# Patient Record
Sex: MALE | Race: WHITE
[De-identification: names, ages, dates, MRNs, and addresses within clinical notes are randomized per-mention and may not be internally consistent; named-entity substitution may affect disease eponyms.]

---

## 2020-01-16 ENCOUNTER — HOSPITAL ENCOUNTER (OUTPATIENT)
Dept: HOSPITAL 47 - RADCTMAIN | Age: 70
Discharge: HOME | End: 2020-01-16
Attending: UROLOGY
Payer: MEDICARE

## 2020-01-16 DIAGNOSIS — I25.10: ICD-10-CM

## 2020-01-16 DIAGNOSIS — K44.9: ICD-10-CM

## 2020-01-16 DIAGNOSIS — R59.9: ICD-10-CM

## 2020-01-16 DIAGNOSIS — K57.30: Primary | ICD-10-CM

## 2020-01-16 DIAGNOSIS — K40.90: ICD-10-CM

## 2020-01-16 DIAGNOSIS — M79.89: ICD-10-CM

## 2020-01-16 PROCEDURE — 74176 CT ABD & PELVIS W/O CONTRAST: CPT

## 2020-01-16 NOTE — CT
EXAMINATION TYPE: CT abdomen pelvis wo con

 

DATE OF EXAM: 1/16/2020

 

COMPARISON: None

 

HISTORY: 69-year-old male Microhematuria

 

CT DLP: 981 mGycm. Automated exposure control for dose reduction was used.

 

TECHNIQUE: Contiguous axial scanning of the abdomen and pelvis without IV contrast. Coronal and sagit
max reconstructions performed. 

 

FINDINGS: 

Three-vessel coronary artery calcifications are present. Heart normal size. No pericardial effusion. 
Lung bases clear without pleural effusion.

 

Small hiatal hernia.

 

Couple hypodensities within the liver measuring up to 1.2 cm likely benign cysts. Possible vague unde
rlying 4.0 cm lesion left hepatic dome, refer to axial image 19. Lack of IV contrast diminishes sensi
tivity.

 

Gallbladder, adrenal glands, and pancreas show no gross abnormal body by noncontrast CT.

 

Small 1.1 cm cortical hypodensity medial upper pole right kidney too small for accurate CT characteri
zation, probable cyst.

 

Suspect parapelvic cysts within the left kidney measuring 2.1 cm and 1.3 cm.

 

No nephrolithiasis or hydronephrosis seen.

 

The spleen is borderline enlarged at 13.6 cm measured on coronal series.

 

There is a 1.7 cm soft tissue nodule at the left upper quadrant, axial image 22. This seems to be sep
arate from the splenic artery. A small gastric fundal diverticulum is suspected. This can be reassess
ed at follow-up.

 

Some prominent fluid-filled small bowel loops in the left side of the abdomen likely transient.

 

A couple borderline sized lymph nodes within the right lower quadrant and mid lower mesentery measuri
ng up to 7 mm are nonspecific.

 

No secondary findings of acute appendicitis.

 

Mild to moderate scattered stool. Severe diverticulosis mid to distal sigmoid colon. No pericolonic i
nflammatory change.

 

Scattered mild atherosclerotic calcifications infrarenal abdominal aorta and iliac arteries.

 

Partial distention of the bladder. Some prominent bilateral inguinal lymph nodes are present. Largest
 measures 1.9 cm on the right and is mildly enlarged, refer to coronal image 32. A small direct left 
inguinal hernia containing a short segment small bowel loop and fat. Prostate gland not seen.

 

Bones: Mild degenerative changes at the hips. Degenerative changes right SI joint. Dextroconvex scoli
osis with associated moderate degenerative disc disease upper lumbar spine.

 

 

 

 

IMPRESSION: 

1.  No nephrolithiasis or hydronephrosis. Rounded areas at the left renal sinus measuring 2.1 and 1.3
 cm is suspected to represent parapelvic cysts.

2.  Indeterminate 1.1 cm cortical lesion medial upper pole right kidney, probable cortical cyst.

3. A 1.7 cm soft tissue nodule left upper quadrant appears separate from the splenic artery. Either a
 splenule or small gastric fundal diverticulum is suspected. Short interval follow-up recommended suc
h as in 3-6 months to reassess.

4. Possible underlying 4.0 cm left hepatic dome lesion subtly apparent on this noncontrast exam. Cons
ider liver ultrasound to further evaluate. If the area remains indeterminate, a contrast enhanced CT 
may be indicated.

5. Extensive mid to distal sigmoid diverticulosis. No evidence for acute diverticulitis.

6. Scattered nonenlarged and borderline sized mesenteric lymph nodes measuring up to 7 mm are nonspec
ific, likely reactive/post inflammatory.

7. Prominent bilateral inguinal lymph nodes, largest is mildly enlarged on the right measuring 1.9 cm
 short axis. Clinical follow-up recommended. If any enlarging lymphadenopathy is noted, the area can 
be re-imaged or sampled.

8. Prostate gland not seen. Small direct left inguinal hernia. Small hiatal hernia. CAD.

## 2021-04-02 ENCOUNTER — HOSPITAL ENCOUNTER (OUTPATIENT)
Dept: HOSPITAL 47 - LABWHC1 | Age: 71
Discharge: HOME | End: 2021-04-02
Attending: UROLOGY
Payer: MEDICARE

## 2021-04-02 DIAGNOSIS — C61: Primary | ICD-10-CM

## 2021-04-02 PROCEDURE — 36415 COLL VENOUS BLD VENIPUNCTURE: CPT

## 2021-04-02 PROCEDURE — 84153 ASSAY OF PSA TOTAL: CPT

## 2023-04-21 ENCOUNTER — HOSPITAL ENCOUNTER (OUTPATIENT)
Dept: HOSPITAL 47 - RADPETMAIN | Age: 73
Discharge: HOME | End: 2023-04-21
Attending: INTERNAL MEDICINE
Payer: MEDICARE

## 2023-04-21 DIAGNOSIS — R59.0: ICD-10-CM

## 2023-04-21 DIAGNOSIS — C91.10: Primary | ICD-10-CM

## 2023-04-21 PROCEDURE — 78815 PET IMAGE W/CT SKULL-THIGH: CPT

## 2023-04-23 NOTE — PE
EXAMINATION TYPE: PET CT fusion skull to thigh

 

DATE OF EXAM: 4/21/2023

 

CLINICAL INDICATION:Male, 72 years old with history of C91.10;  

 

TECHNIQUE:  Following the intravenous administration of  12.27 mCi of F-18 FDG, whole body images are
 performed from the skull base to the midthigh.  Images are reviewed on the computer in the coronal, 
axial, and sagittal planes.  Reconstructed rotating images are created on independent workstation and
 reviewed on the computer.   A non-contrast CT is performed in conjunction with the PET scan. Glucose
 level 98 mg/dL

 

COMPARISON: CT 1/16/2020, PET/CT None, 

 

FINDINGS: 

Mediastinal SUV mean is 2.0. Hepatic parenchyma SUV mean is 2.4. 

 

SKULL BASE AND NECK:  

Right lower neck FDG avid lymph node max SUV 3.1 measuring 12 mm in short axis.

 

CHEST, MEDIASTINUM, AND HILAR REGION: Scattered mediastinal lymphadenopathy which is FDG avid. Exampl
es include:

*  Right high paratracheal 12 mm short axis and max SUV 3.3

*  Left low paratracheal conglomerate lymphadenopathy max SUV 4.6. 

*  Right low paratracheal conglomerate lymphadenopathy max SUV 5.6

*  Subcarinal conglomerate lymphadenopathy max SUV 4.9.

*  AP lymph node 25 mm short axis and max SUV 2.9 

Evaluation of measurements is limited secondary to lack of IV contrast. 

 

ABDOMEN AND PELVIS: No suspicious radiotracer activity.

 

OSSEOUS STRUCTURES: No suspicious radiotracer activity.

 

OTHER CT: Atherosclerosis of the arterial vasculature and coronary arteries. Not is mildly enlarged f
or size. Trace right pleural effusion. Trace pericardial effusion. Scattered colonic diverticula. Mul
tilevel disc degeneration changes throughout the spine with scoliosis changes.

 

IMPRESSION: 

Conglomerate lymphadenopathy throughout the mediastinum and right low neck and system with provided h
istory of leukemia.